# Patient Record
Sex: FEMALE | Race: AMERICAN INDIAN OR ALASKA NATIVE
[De-identification: names, ages, dates, MRNs, and addresses within clinical notes are randomized per-mention and may not be internally consistent; named-entity substitution may affect disease eponyms.]

---

## 2017-08-11 NOTE — XRAY REPORT
Left knee 2 views:



History: Pain in left knee.



Findings:



Minimal narrowing of medial and patellofemoral compartment knee joint. 

Sclerotic articular surfaces with early degenerative changes. More 

pronounced in the patellofemoral compartment. No fracture. No joint 

effusion.



Impression:



Mild arthritic changes medial and patellofemoral compartment knee joint.

## 2017-08-11 NOTE — FLUOROSCOPY REPORT
Image guided lumbar puncture:



Unsteady gait.



The patient was placed in a prone position on the fluoroscopic table. 

The skin was cleansed and draped. 1% Xylocaine used for local 

anesthesia. Using an 18-gauge needle a successful puncture was made at 

L3 under fluoroscopic guidance. Clear fluid was removed into 4 separate 

tubes with an approximate volume of 12 cc. This was sent to the lab for 

evaluation is requested. No complications encountered.

## 2017-08-11 NOTE — HISTORY AND PHYSICAL REPORT
History of Present Illness


Date of examination: 08/11/17


Chief complaint: 





unsteady gate


History of present illness: 





chronic





Medications and Allergies


 Allergies











Allergy/AdvReac Type Severity Reaction Status Date / Time


 


Sulfa (Sulfonamide Allergy  Rash Verified 08/11/17 12:05





Antibiotics)     











 Home Medications











 Medication  Instructions  Recorded  Confirmed  Last Taken  Type


 


B Complex 1 tab PO DAILY 08/11/17 08/11/17 08/10/17 History





    1 tab 


 


Calcium 600 mg PO DAILY 08/11/17 08/11/17 08/10/17 History





    600mg 


 


Claritin 10 mg PO PRN PRN 08/11/17 08/11/17 08/10/17 History





    1 tab 


 


Claritin-D 24HR 1 tab PO PRN PRN 08/11/17 08/11/17 07/30/17 History





    1 tab 


 


Esomeprazole Magnesium [NexIUM] 40 mg PO DAILY 08/11/17 08/11/17 08/11/17 10:00 

History





    40mg 


 


Flexeril 10 MG TAB 10 mg PO TID 08/11/17 08/11/17 08/10/17 History





    10mg 


 


Flonase 2 puff INTRANASAL DAILY 08/11/17 08/11/17 08/10/17 History





    2 puffs 


 


Gabapentin [Neurontin] 300 mg PO TID 08/11/17 08/11/17 08/10/17 History





    300mg 


 


HYDROcodone/APAP 7.5-325 1 tab PO TID 08/11/17 08/11/17 08/11/17 10:15 History





    1 tab 


 


Prenatal Vit #105/Iron/FA/Dha 1 tab PO DAILY 08/11/17 08/11/17 08/10/17 History





    1 tab 














Exam


 Vital Signs











Temp Pulse Resp BP Pulse Ox


 


 97.8 F   79   16   138/81   98 


 


 08/11/17 13:30  08/11/17 13:30  08/11/17 13:30  08/11/17 13:30  08/11/17 13:30

## 2017-08-11 NOTE — PROCEDURE NOTE
Date of procedure: 08/11/17


Pre-op diagnosis: unsteady gate


Post-op diagnosis: same


Procedure: 





lumbar puncture


Findings: 





clear csf


Anesthesia: local


Surgeon: RAMA HUERTA


Estimated blood loss: none


Pathology: list (csf)


Specimen disposition: to lab


Condition: stable


Disposition: observation

## 2017-10-25 NOTE — MAMMOGRAPHY REPORT
Screening mammogram:



History left breast cancer, lumpectomy, radiation therapy.



Routine views are compared to prior studies dated back to 2015. 

Postsurgical mild architectural change is identified in the medial left 

breast with skin thickening. The remainder of the fibroglandular 

pattern is mostly fatty replaced bilaterally with no interval change is 

identified compared to prior exam.



CAD used.



Impression:



Stable exam.



Recommendation



Annual mammogram followup.



BI-RADS CATEGORY:  2 = Benign



ACR BI-RADS MAMMOGRAPHIC CODES:

0 = Needs additional imaging evaluation; 1 = Negative; 2 = Benign; 3 = 

Probably benign; 4 = Suspicious; 5 = Malignant; 6 = Known biopsy-proven 

malignancy



COMMENT:

      1.   Dense breast tissue, i.e., adenosis, fibrocystic 

            changes, etc., may obscure an underlying neoplasm.

      2.   Approximately 10% of cancers are not detected with

            mammography.

      3.   A negative mammography report should not delay biopsy 

            if a clinically suspicious mass is present.

## 2017-10-25 NOTE — MAMMOGRAPHY REPORT
BONE DENSITY STUDY:



DEFINITIONS:

  BMD     = Bone Mineral Density

  T-score = BMD related to mean peak bone mass of young adult

            (mean expressed in Standard Deviation)

  Z-score = Age matched BMD expressed in SD



World Health Organization (WHO) Diagnostic Criteria

  Normal             T-score > -1 SD

  Osteopenia      T-score between -1 and -2.4 SD

  Osteoporosis    T-score -2.5 SD or below



FINDINGS:

The weighted average BMD of lumbar spine L1-L4 is 0.967 with a T-score 

of -2.7.



The weighted average BMD of hip is 0.857 with a T-score of -0.7.



IMPRESSION:

The patient's T-score is diagnostic for normal bone density and

low relative risk for fracture.



NOTE:

BMD is not the only risk factor for fracture; also consider

factors such as the patient's age, risk of falling, previous

osteoporotic fracture, family history of osteoporotic fractures, 

current smoker, and low body weight.



Wooten's triangle is a region of interest in femur, predominantly

of trabecular bone.  It is not a true anatomic site, and [facility] does not 

recommend its use clinically.

## 2018-02-08 ENCOUNTER — HOSPITAL ENCOUNTER (OUTPATIENT)
Dept: HOSPITAL 5 - XRAY | Age: 58
Discharge: HOME | End: 2018-02-08
Attending: PAIN MEDICINE
Payer: COMMERCIAL

## 2018-02-08 DIAGNOSIS — M19.031: Primary | ICD-10-CM

## 2018-02-08 NOTE — XRAY REPORT
FINAL REPORT



PROCEDURE:  Right wrist. 



TECHNIQUE:  AP and lateral views.



HISTORY:  Right wrist pain. 



COMPARISON:  No prior studies are available for comparison.



FINDINGS:  

The bones appear intact without fracture or dislocation. There is

mild narrowing of the radiocarpal joint. The remaining joint

spaces appear satisfactory. The soft tissues are unremarkable.



IMPRESSION:  

Mild osteoarthritis.